# Patient Record
Sex: MALE | Race: WHITE | NOT HISPANIC OR LATINO | Employment: FULL TIME | URBAN - METROPOLITAN AREA
[De-identification: names, ages, dates, MRNs, and addresses within clinical notes are randomized per-mention and may not be internally consistent; named-entity substitution may affect disease eponyms.]

---

## 2018-01-16 ENCOUNTER — OFFICE VISIT (OUTPATIENT)
Dept: RETAIL CLINIC | Facility: CLINIC | Age: 48
End: 2018-01-16

## 2018-01-16 VITALS
OXYGEN SATURATION: 98 % | TEMPERATURE: 99.6 F | RESPIRATION RATE: 18 BRPM | DIASTOLIC BLOOD PRESSURE: 80 MMHG | SYSTOLIC BLOOD PRESSURE: 115 MMHG

## 2018-01-16 DIAGNOSIS — R05.9 COUGH: ICD-10-CM

## 2018-01-16 DIAGNOSIS — J10.1 INFLUENZA A: Primary | ICD-10-CM

## 2018-01-16 PROBLEM — R68.89 FLU-LIKE SYMPTOMS: Status: ACTIVE | Noted: 2018-01-16

## 2018-01-16 LAB
EXPIRATION DATE: ABNORMAL
FLUAV AG NPH QL: ABNORMAL
FLUBV AG NPH QL: NEGATIVE
INTERNAL CONTROL: ABNORMAL
Lab: ABNORMAL

## 2018-01-16 PROCEDURE — 99213 OFFICE O/P EST LOW 20 MIN: CPT | Performed by: NURSE PRACTITIONER

## 2018-01-16 PROCEDURE — 87804 INFLUENZA ASSAY W/OPTIC: CPT | Performed by: NURSE PRACTITIONER

## 2018-01-16 RX ORDER — OSELTAMIVIR PHOSPHATE 75 MG/1
75 CAPSULE ORAL 2 TIMES DAILY
Qty: 10 CAPSULE | Refills: 0 | Status: SHIPPED | OUTPATIENT
Start: 2018-01-16 | End: 2018-03-09

## 2018-01-16 RX ORDER — BROMPHENIRAMINE MALEATE, PSEUDOEPHEDRINE HYDROCHLORIDE, AND DEXTROMETHORPHAN HYDROBROMIDE 2; 30; 10 MG/5ML; MG/5ML; MG/5ML
5 SYRUP ORAL 4 TIMES DAILY PRN
Qty: 150 ML | Refills: 0 | Status: SHIPPED | OUTPATIENT
Start: 2018-01-16 | End: 2018-03-09

## 2018-01-16 NOTE — PROGRESS NOTES
Subjective   Allen Johnson is a 47 y.o. male.     Flu Symptoms   This is a new problem. The current episode started yesterday. The problem occurs constantly. The problem has been gradually worsening. Associated symptoms include coughing, fatigue, a fever, headaches and myalgias. He has tried acetaminophen for the symptoms. The treatment provided mild relief.   Cough   This is a new problem. The current episode started yesterday. Associated symptoms include a fever, headaches, myalgias, postnasal drip and rhinorrhea. Pertinent negatives include no wheezing. Risk factors for lung disease include smoking/tobacco exposure. His past medical history is significant for environmental allergies and pneumonia.        The following portions of the patient's history were reviewed and updated as appropriate: allergies, current medications, past family history, past medical history, past social history, past surgical history and problem list.    Review of Systems   Constitutional: Positive for fatigue and fever.   HENT: Positive for postnasal drip, rhinorrhea and voice change.    Eyes: Negative.    Respiratory: Positive for cough. Negative for chest tightness and wheezing.    Cardiovascular: Negative.    Gastrointestinal: Negative.    Musculoskeletal: Positive for myalgias.   Allergic/Immunologic: Positive for environmental allergies.   Neurological: Positive for headaches.       Objective   Physical Exam   Constitutional: He appears well-developed.   HENT:   Head: Normocephalic and atraumatic.   Right Ear: External ear normal.   Left Ear: External ear normal.   Nose: Mucosal edema and rhinorrhea present. Right sinus exhibits no maxillary sinus tenderness and no frontal sinus tenderness. Left sinus exhibits no maxillary sinus tenderness and no frontal sinus tenderness.   Mouth/Throat: No oropharyngeal exudate, posterior oropharyngeal edema or posterior oropharyngeal erythema.   Eyes: Pupils are equal, round, and reactive to  light.   Neck: Normal range of motion.   Cardiovascular: Normal rate, regular rhythm and normal heart sounds.    Pulmonary/Chest: Effort normal. No respiratory distress. He has no decreased breath sounds. He has no wheezes. He has rhonchi in the right middle field and the left middle field. He has no rales. He exhibits no tenderness.   Abdominal: Soft. Bowel sounds are normal.   Nursing note and vitals reviewed.      Assessment/Plan   Allen was seen today for flu symptoms.    Diagnoses and all orders for this visit:    Influenza A  -     POCT Influenza A/B    Other orders  -     oseltamivir (TAMIFLU) 75 MG capsule; Take 1 capsule by mouth 2 (Two) Times a Day.  -     Chlorcyclizine-Pseudoephed (STAHIST AD) 25-60 MG tablet; Take 1 tablet by mouth 2 (Two) Times a Day for 7 days.  -     brompheniramine-pseudoephedrine-DM 30-2-10 MG/5ML syrup; Take 5 mL by mouth 4 (Four) Times a Day As Needed for Cough.      Talked to the patient about the diagnosis and educate the patient and advise to visit to PCP if the symptoms worsens

## 2018-01-16 NOTE — PATIENT INSTRUCTIONS
"Influenza, Adult  Influenza (“the flu\") is an infection in the lungs, nose, and throat (respiratory tract). It is caused by a virus. The flu causes many common cold symptoms, as well as a high fever and body aches. It can make you feel very sick.  The flu spreads easily from person to person (is contagious). Getting a flu shot (influenza vaccination) every year is the best way to prevent the flu.  Follow these instructions at home:  · Take over-the-counter and prescription medicines only as told by your doctor.  · Use a cool mist humidifier to add moisture (humidity) to the air in your home. This can make it easier to breathe.  · Rest as needed.  · Drink enough fluid to keep your pee (urine) clear or pale yellow.  · Cover your mouth and nose when you cough or sneeze.  · Wash your hands with soap and water often, especially after you cough or sneeze. If you cannot use soap and water, use hand .  · Stay home from work or school as told by your doctor. Unless you are visiting your doctor, try to avoid leaving home until your fever has been gone for 24 hours without the use of medicine.  · Keep all follow-up visits as told by your doctor. This is important.  How is this prevented?  · Getting a yearly (annual) flu shot is the best way to avoid getting the flu. You may get the flu shot in late summer, fall, or winter. Ask your doctor when you should get your flu shot.  · Wash your hands often or use hand  often.  · Avoid contact with people who are sick during cold and flu season.  · Eat healthy foods.  · Drink plenty of fluids.  · Get enough sleep.  · Exercise regularly.  Contact a doctor if:  · You get new symptoms.  · You have:  ¨ Chest pain.  ¨ Watery poop (diarrhea).  ¨ A fever.  · Your cough gets worse.  · You start to have more mucus.  · You feel sick to your stomach (nauseous).  · You throw up (vomit).  Get help right away if:  · You start to be short of breath or have trouble breathing.  · Your " skin or nails turn a bluish color.  · You have very bad pain or stiffness in your neck.  · You get a sudden headache.  · You get sudden pain in your face or ear.  · You cannot stop throwing up.  This information is not intended to replace advice given to you by your health care provider. Make sure you discuss any questions you have with your health care provider.  Document Released: 09/26/2009 Document Revised: 05/25/2017 Document Reviewed: 10/11/2016  [employer] Interactive Patient Education © 2017 [employer]  Talked to the patient about the diagnosis and educate the patient and advise to visit to PCP if the symptoms worsens  Cough, Adult  Coughing is a reflex that clears your throat and your airways. Coughing helps to heal and protect your lungs. It is normal to cough occasionally, but a cough that happens with other symptoms or lasts a long time may be a sign of a condition that needs treatment. A cough may last only 2-3 weeks (acute), or it may last longer than 8 weeks (chronic).  What are the causes?  Coughing is commonly caused by:  · Breathing in substances that irritate your lungs.  · A viral or bacterial respiratory infection.  · Allergies.  · Asthma.  · Postnasal drip.  · Smoking.  · Acid backing up from the stomach into the esophagus (gastroesophageal reflux).  · Certain medicines.  · Chronic lung problems, including COPD (or rarely, lung cancer).  · Other medical conditions such as heart failure.  Follow these instructions at home:  Pay attention to any changes in your symptoms. Take these actions to help with your discomfort:  · Take medicines only as told by your health care provider.  ¨ If you were prescribed an antibiotic medicine, take it as told by your health care provider. Do not stop taking the antibiotic even if you start to feel better.  ¨ Talk with your health care provider before you take a cough suppressant medicine.  · Drink enough fluid to keep your urine clear or pale yellow.  · If the  air is dry, use a cold steam vaporizer or humidifier in your bedroom or your home to help loosen secretions.  · Avoid anything that causes you to cough at work or at home.  · If your cough is worse at night, try sleeping in a semi-upright position.  · Avoid cigarette smoke. If you smoke, quit smoking. If you need help quitting, ask your health care provider.  · Avoid caffeine.  · Avoid alcohol.  · Rest as needed.  Contact a health care provider if:  · You have new symptoms.  · You cough up pus.  · Your cough does not get better after 2-3 weeks, or your cough gets worse.  · You cannot control your cough with suppressant medicines and you are losing sleep.  · You develop pain that is getting worse or pain that is not controlled with pain medicines.  · You have a fever.  · You have unexplained weight loss.  · You have night sweats.  Get help right away if:  · You cough up blood.  · You have difficulty breathing.  · Your heartbeat is very fast.  This information is not intended to replace advice given to you by your health care provider. Make sure you discuss any questions you have with your health care provider.  Document Released: 06/15/2012 Document Revised: 05/25/2017 Document Reviewed: 02/24/2016  [employer] Interactive Patient Education © 2017 [employer]  Talked to the patient about the diagnosis and the positive flu test educate the patient and advise to visit to PCP if the symptoms worsens

## 2018-03-09 ENCOUNTER — OFFICE VISIT (OUTPATIENT)
Dept: INTERNAL MEDICINE | Facility: CLINIC | Age: 48
End: 2018-03-09

## 2018-03-09 VITALS
WEIGHT: 251 LBS | HEIGHT: 71 IN | HEART RATE: 81 BPM | DIASTOLIC BLOOD PRESSURE: 84 MMHG | SYSTOLIC BLOOD PRESSURE: 134 MMHG | BODY MASS INDEX: 35.14 KG/M2 | OXYGEN SATURATION: 95 %

## 2018-03-09 DIAGNOSIS — L98.9 NON-HEALING SKIN LESION: Primary | ICD-10-CM

## 2018-03-09 PROCEDURE — 99213 OFFICE O/P EST LOW 20 MIN: CPT | Performed by: INTERNAL MEDICINE

## 2018-03-09 NOTE — PROGRESS NOTES
Subjective     Allen Johnson is a 48 y.o. male, who presents with a chief complaint of   Chief Complaint   Patient presents with   • Nevus     two spots on back, has gotten large and discolored. X1yr       HPI   The pt has 2 large lesions on his back.  The one on his upper back for a couple of years and a lower one that has been there 8-12 months.  He has never had skin ca..  No hx skin cancer.  + tobacco use.   He has  worked outside with out a shirt for years.  He does wear sunscreen.      The following portions of the patient's history were reviewed and updated as appropriate: allergies, current medications, past family history, past medical history, past social history, past surgical history and problem list.    Allergies: Review of patient's allergies indicates no known allergies.    Review of Systems   Constitutional: Negative.    HENT: Negative.    Eyes: Negative.    Respiratory: Negative.    Cardiovascular: Negative.    Gastrointestinal: Negative.    Endocrine: Negative.    Genitourinary: Negative.    Musculoskeletal: Negative.    Skin:        Skin lesion   Allergic/Immunologic: Negative.    Neurological: Negative.    Hematological: Negative.    Psychiatric/Behavioral: Negative.    All other systems reviewed and are negative.      Objective     Wt Readings from Last 3 Encounters:   03/09/18 114 kg (251 lb)   03/26/15 103 kg (226 lb 9.1 oz)   07/24/14 116 kg (256 lb 4.2 oz)     Temp Readings from Last 3 Encounters:   01/16/18 99.6 °F (37.6 °C)   03/26/15 98.8 °F (37.1 °C)   07/24/14 98.5 °F (36.9 °C)     BP Readings from Last 3 Encounters:   03/09/18 134/84   01/16/18 115/80   03/26/15 122/90     Pulse Readings from Last 3 Encounters:   03/09/18 81   03/26/15 68   07/24/14 68     Body mass index is 35.01 kg/(m^2).  SpO2 Readings from Last 3 Encounters:   03/09/18 95%   01/16/18 98%   03/26/15 97%       Physical Exam   Constitutional: He is oriented to person, place, and time. He appears well-developed and  well-nourished. No distress.   HENT:   Head: Normocephalic and atraumatic.   Right Ear: External ear normal.   Left Ear: External ear normal.   Nose: Nose normal.   Mouth/Throat: Oropharynx is clear and moist.   Eyes: Conjunctivae and EOM are normal. Pupils are equal, round, and reactive to light.   Neck: Normal range of motion. Neck supple.   Cardiovascular: Normal rate, regular rhythm, normal heart sounds and intact distal pulses.    Pulmonary/Chest: Effort normal and breath sounds normal. No respiratory distress. He has no wheezes.   Musculoskeletal: Normal range of motion.   Normal gait   Neurological: He is alert and oriented to person, place, and time.   Skin: Skin is warm and dry.   2cm lesion on right upper back with ulceration and scabs  1cm lesion on lower back that is raised with telangiectasias.    Psychiatric: He has a normal mood and affect. His behavior is normal. Judgment and thought content normal.   Nursing note and vitals reviewed.      Results for orders placed or performed in visit on 01/16/18   POCT Influenza A/B   Result Value Ref Range    Rapid Influenza A Ag positve     Rapid Influenza B Ag negative     Internal Control Passed Passed    Lot Number [id number]     Expiration Date 12/2019        Assessment/Plan   Allen was seen today for nevus.    Diagnoses and all orders for this visit:    Non-healing skin lesion  -     Ambulatory Referral to General Surgery  -     Ambulatory Referral to Dermatology      Will try to get pt set up with surgery for lesion removal.  Discussed concern for skin cancer with patient.  Will also refer to derm but would like to get lesions removed and path report as soon as possible.    Outpatient Medications Prior to Visit   Medication Sig Dispense Refill   • brompheniramine-pseudoephedrine-DM 30-2-10 MG/5ML syrup Take 5 mL by mouth 4 (Four) Times a Day As Needed for Cough. 150 mL 0   • oseltamivir (TAMIFLU) 75 MG capsule Take 1 capsule by mouth 2 (Two) Times a Day. 10  capsule 0     No facility-administered medications prior to visit.      No orders of the defined types were placed in this encounter.    [unfilled]  Medications Discontinued During This Encounter   Medication Reason   • brompheniramine-pseudoephedrine-DM 30-2-10 MG/5ML syrup *Therapy completed   • oseltamivir (TAMIFLU) 75 MG capsule *Therapy completed         Return if symptoms worsen or fail to improve.

## 2018-07-12 ENCOUNTER — OFFICE VISIT (OUTPATIENT)
Dept: RETAIL CLINIC | Facility: CLINIC | Age: 48
End: 2018-07-12

## 2018-07-12 VITALS
TEMPERATURE: 98.3 F | RESPIRATION RATE: 18 BRPM | SYSTOLIC BLOOD PRESSURE: 122 MMHG | DIASTOLIC BLOOD PRESSURE: 86 MMHG | HEART RATE: 84 BPM | OXYGEN SATURATION: 98 %

## 2018-07-12 DIAGNOSIS — K52.9 GASTROENTERITIS, ACUTE: Primary | ICD-10-CM

## 2018-07-12 PROCEDURE — 99213 OFFICE O/P EST LOW 20 MIN: CPT | Performed by: NURSE PRACTITIONER

## 2018-07-12 RX ORDER — PROMETHAZINE HYDROCHLORIDE 25 MG/1
25 TABLET ORAL EVERY 8 HOURS PRN
Qty: 18 TABLET | Refills: 0 | Status: SHIPPED | OUTPATIENT
Start: 2018-07-12 | End: 2018-07-15

## 2018-07-12 NOTE — PROGRESS NOTES
"Subjective   Allen Johnson is a 48 y.o. male.     Patient reports ill contact whom has been complaining of similar sx as he is experiencing beginning yesterday, 1 day after they both ate at a restaurant near by.  2 other people who ate dinner with them are asymptomatic.       Nausea   This is a new problem. The current episode started yesterday. The problem occurs constantly. The problem has been gradually improving. Associated symptoms include abdominal pain (\"cramping\" bilat lower ABD), anorexia, chills, coughing (unchanged from baseline), diaphoresis, fatigue, a fever (tmax 99.2), headaches, myalgias and nausea. Pertinent negatives include no change in bowel habit, chest pain, congestion, neck pain, sore throat, swollen glands, urinary symptoms, vertigo, visual change, vomiting or weakness. The symptoms are aggravated by eating. Treatments tried: ibuprofen last HS. The treatment provided mild relief.       The following portions of the patient's history were reviewed and updated as appropriate: allergies, current medications, past family history, past medical history, past social history, past surgical history and problem list.    Review of Systems   Constitutional: Positive for appetite change (diminished), chills, diaphoresis, fatigue and fever (tmax 99.2).   HENT: Negative for congestion, mouth sores, sinus pressure, sneezing, sore throat, trouble swallowing and voice change.    Respiratory: Positive for cough (unchanged from baseline). Negative for chest tightness and stridor.    Cardiovascular: Negative for chest pain and palpitations.   Gastrointestinal: Positive for abdominal pain (\"cramping\" bilat lower ABD), anorexia and nausea. Negative for abdominal distention, blood in stool, change in bowel habit, constipation, diarrhea and vomiting.   Genitourinary: Negative for decreased urine volume, dysuria and flank pain.   Musculoskeletal: Positive for myalgias. Negative for neck pain.   Skin: Negative for " color change.   Allergic/Immunologic: Negative for immunocompromised state.   Neurological: Positive for headaches. Negative for dizziness, vertigo, syncope and weakness.       Objective   Physical Exam   Constitutional: He is oriented to person, place, and time. He appears well-developed and well-nourished. He is cooperative.  Non-toxic appearance. He does not appear ill. No distress.   HENT:   Mouth/Throat: Uvula is midline, oropharynx is clear and moist and mucous membranes are normal.   Eyes: Conjunctivae and lids are normal.   Cardiovascular: Normal rate, regular rhythm, S1 normal and S2 normal.    Pulmonary/Chest: Effort normal. He has decreased breath sounds in the right upper field, the right middle field, the right lower field, the left upper field, the left middle field and the left lower field. He has no wheezes. He has no rhonchi. He has no rales.   Abdominal: Soft. Normal appearance and bowel sounds are normal. He exhibits no abdominal bruit and no mass. There is no tenderness. There is no rigidity, no rebound, no guarding, no CVA tenderness, no tenderness at McBurney's point and negative Peace's sign.   Lymphadenopathy:     He has no cervical adenopathy.   Neurological: He is alert and oriented to person, place, and time.   Skin: Skin is warm. He is diaphoretic. No pallor.   Vitals reviewed.      Assessment/Plan   Allen was seen today for nausea.    Diagnoses and all orders for this visit:    Gastroenteritis, acute  -     promethazine (PHENERGAN) 25 MG tablet; Take 1 tablet by mouth Every 8 (Eight) Hours As Needed for Nausea or Vomiting for up to 3 days.          -     Symptomatic care: rest, BRAT diet-advance as tolerated, ensure adequate H2O intake, may use ibuprofen OTC PRN pain/fever relief        -     Follow up with PCP for persistent symptoms or UC/ER for worsening symptoms-discussed symptoms warranting ER evluation

## 2018-07-12 NOTE — PATIENT INSTRUCTIONS
Viral Gastroenteritis, Adult  Viral gastroenteritis is also known as the stomach flu. This condition is caused by various viruses. These viruses can be passed from person to person very easily (are very contagious). This condition may affect your stomach, small intestine, and large intestine. It can cause sudden watery diarrhea, fever, and vomiting.  Diarrhea and vomiting can make you feel weak and cause you to become dehydrated. You may not be able to keep fluids down. Dehydration can make you tired and thirsty, cause you to have a dry mouth, and decrease how often you urinate. Older adults and people with other diseases or a weak immune system are at higher risk for dehydration.  It is important to replace the fluids that you lose from diarrhea and vomiting. If you become severely dehydrated, you may need to get fluids through an IV tube.  What are the causes?  Gastroenteritis is caused by various viruses, including rotavirus and norovirus. Norovirus is the most common cause in adults.  You can get sick by eating food, drinking water, or touching a surface contaminated with one of these viruses. You can also get sick from sharing utensils or other personal items with an infected person.  What increases the risk?  This condition is more likely to develop in people:  · Who have a weak defense system (immune system).  · Who live with one or more children who are younger than 2 years old.  · Who live in a nursing home.  · Who go on cruise ships.    What are the signs or symptoms?  Symptoms of this condition start suddenly 1-2 days after exposure to a virus. Symptoms may last a few days or as long as a week. The most common symptoms are watery diarrhea and vomiting. Other symptoms include:  · Fever.  · Headache.  · Fatigue.  · Pain in the abdomen.  · Chills.  · Weakness.  · Nausea.  · Muscle aches.  · Loss of appetite.    How is this diagnosed?  This condition is diagnosed with a medical history and physical exam. You  may also have a stool test to check for viruses or other infections.  How is this treated?  This condition typically goes away on its own. The focus of treatment is to restore lost fluids (rehydration). Your health care provider may recommend that you take an oral rehydration solution (ORS) to replace important salts and minerals (electrolytes) in your body. Severe cases of this condition may require giving fluids through an IV tube.  Treatment may also include medicine to help with your symptoms.  Follow these instructions at home:  Follow instructions from your health care provider about how to care for yourself at home.  Eating and drinking  Follow these recommendations as told by your health care provider:  · Take an ORS. This is a drink that is sold at pharmacies and retail stores.  · Drink clear fluids in small amounts as you are able. Clear fluids include water, ice chips, diluted fruit juice, and low-calorie sports drinks.  · Eat bland, easy-to-digest foods in small amounts as you are able. These foods include bananas, applesauce, rice, lean meats, toast, and crackers.  · Avoid fluids that contain a lot of sugar or caffeine, such as energy drinks, sports drinks, and soda.  · Avoid alcohol.  · Avoid spicy or fatty foods.    General instructions    · Drink enough fluid to keep your urine clear or pale yellow.  · Wash your hands often. If soap and water are not available, use hand .  · Make sure that all people in your household wash their hands well and often.  · Take over-the-counter and prescription medicines only as told by your health care provider.  · Rest at home while you recover.  · Watch your condition for any changes.  · Take a warm bath to relieve any burning or pain from frequent diarrhea episodes.  · Keep all follow-up visits as told by your health care provider. This is important.  Contact a health care provider if:  · You cannot keep fluids down.  · Your symptoms get worse.  · You have  new symptoms.  · You feel light-headed or dizzy.  · You have muscle cramps.  Get help right away if:  · You have chest pain.  · You feel extremely weak or you faint.  · You see blood in your vomit.  · Your vomit looks like coffee grounds.  · You have bloody or black stools or stools that look like tar.  · You have a severe headache, a stiff neck, or both.  · You have a rash.  · You have severe pain, cramping, or bloating in your abdomen.  · You have trouble breathing or you are breathing very quickly.  · Your heart is beating very quickly.  · Your skin feels cold and clammy.  · You feel confused.  · You have pain when you urinate.  · You have signs of dehydration, such as:  ? Dark urine, very little urine, or no urine.  ? Cracked lips.  ? Dry mouth.  ? Sunken eyes.  ? Sleepiness.  ? Weakness.  This information is not intended to replace advice given to you by your health care provider. Make sure you discuss any questions you have with your health care provider.  Document Released: 12/18/2006 Document Revised: 05/31/2017 Document Reviewed: 08/23/2016  [employer] Interactive Patient Education © 2018 [employer]

## 2018-09-10 ENCOUNTER — OFFICE VISIT (OUTPATIENT)
Dept: RETAIL CLINIC | Facility: CLINIC | Age: 48
End: 2018-09-10

## 2018-09-10 VITALS
SYSTOLIC BLOOD PRESSURE: 116 MMHG | RESPIRATION RATE: 16 BRPM | DIASTOLIC BLOOD PRESSURE: 78 MMHG | HEART RATE: 69 BPM | OXYGEN SATURATION: 98 % | TEMPERATURE: 98.4 F

## 2018-09-10 DIAGNOSIS — J04.0 LARYNGITIS, ACUTE: ICD-10-CM

## 2018-09-10 DIAGNOSIS — J40 BRONCHITIS: Primary | ICD-10-CM

## 2018-09-10 PROCEDURE — 99213 OFFICE O/P EST LOW 20 MIN: CPT | Performed by: NURSE PRACTITIONER

## 2018-09-10 RX ORDER — PREDNISONE 20 MG/1
20 TABLET ORAL 2 TIMES DAILY
Qty: 14 TABLET | Refills: 0 | Status: SHIPPED | OUTPATIENT
Start: 2018-09-10 | End: 2018-09-17

## 2018-09-10 RX ORDER — DOXYCYCLINE HYCLATE 100 MG
100 TABLET ORAL 2 TIMES DAILY
Qty: 14 TABLET | Refills: 0 | Status: SHIPPED | OUTPATIENT
Start: 2018-09-10 | End: 2018-09-17

## 2018-09-10 NOTE — PATIENT INSTRUCTIONS
Laryngitis  Laryngitis is inflammation of your vocal cords. This causes hoarseness, coughing, loss of voice, sore throat, or a dry throat. Your vocal cords are two bands of muscles that are found in your throat. When you speak, these cords come together and vibrate. These vibrations come out through your mouth as sound. When your vocal cords are inflamed, your voice sounds different.  Laryngitis can be temporary (acute) or long-term (chronic). Most cases of acute laryngitis improve with time. Chronic laryngitis is laryngitis that lasts for more than three weeks.  What are the causes?  Acute laryngitis may be caused by:  · A viral infection.  · Lots of talking, yelling, or singing. This is also called vocal strain.  · Bacterial infections.    Chronic laryngitis may be caused by:  · Vocal strain.  · Injury to your vocal cords.  · Acid reflux (gastroesophageal reflux disease or GERD).  · Allergies.  · Sinus infection.  · Smoking.  · Alcohol abuse.  · Breathing in chemicals or dust.  · Growths on the vocal cords.    What increases the risk?  Risk factors for laryngitis include:  · Smoking.  · Alcohol abuse.  · Having allergies.    What are the signs or symptoms?  Symptoms of laryngitis may include:  · Low, hoarse voice.  · Loss of voice.  · Dry cough.  · Sore throat.  · Stuffy nose.    How is this diagnosed?  Laryngitis may be diagnosed by:  · Physical exam.  · Throat culture.  · Blood test.  · Laryngoscopy. This procedure allows your health care provider to look at your vocal cords with a mirror or viewing tube.    How is this treated?  Treatment for laryngitis depends on what is causing it. Usually, treatment involves resting your voice and using medicines to soothe your throat. However, if your laryngitis is caused by a bacterial infection, you may need to take antibiotic medicine. If your laryngitis is caused by a growth, you may need to have a procedure to remove it.  Follow these instructions at home:  · Drink  enough fluid to keep your urine clear or pale yellow.  · Breathe in moist air. Use a humidifier if you live in a dry climate.  · Take medicines only as directed by your health care provider.  · If you were prescribed an antibiotic medicine, finish it all even if you start to feel better.  · Do not smoke cigarettes or electronic cigarettes. If you need help quitting, ask your health care provider.  · Talk as little as possible. Also avoid whispering, which can cause vocal strain.  · Write instead of talking. Do this until your voice is back to normal.  Contact a health care provider if:  · You have a fever.  · You have increasing pain.  · You have difficulty swallowing.  Get help right away if:  · You cough up blood.  · You have trouble breathing.  This information is not intended to replace advice given to you by your health care provider. Make sure you discuss any questions you have with your health care provider.  Document Released: 12/18/2006 Document Revised: 05/25/2017 Document Reviewed: 06/02/2015  [employer] Interactive Patient Education © 2018 [employer]  Acute Bronchitis, Adult  Acute bronchitis is sudden (acute) swelling of the air tubes (bronchi) in the lungs. Acute bronchitis causes these tubes to fill with mucus, which can make it hard to breathe. It can also cause coughing or wheezing.  In adults, acute bronchitis usually goes away within 2 weeks. A cough caused by bronchitis may last up to 3 weeks. Smoking, allergies, and asthma can make the condition worse. Repeated episodes of bronchitis may cause further lung problems, such as chronic obstructive pulmonary disease (COPD).  What are the causes?  This condition can be caused by germs and by substances that irritate the lungs, including:  · Cold and flu viruses. This condition is most often caused by the same virus that causes a cold.  · Bacteria.  · Exposure to tobacco smoke, dust, fumes, and air pollution.    What increases the risk?  This  condition is more likely to develop in people who:  · Have close contact with someone with acute bronchitis.  · Are exposed to lung irritants, such as tobacco smoke, dust, fumes, and vapors.  · Have a weak immune system.  · Have a respiratory condition such as asthma.    What are the signs or symptoms?  Symptoms of this condition include:  · A cough.  · Coughing up clear, yellow, or green mucus.  · Wheezing.  · Chest congestion.  · Shortness of breath.  · A fever.  · Body aches.  · Chills.  · A sore throat.    How is this diagnosed?  This condition is usually diagnosed with a physical exam. During the exam, your health care provider may order tests, such as chest X-rays, to rule out other conditions. He or she may also:  · Test a sample of your mucus for bacterial infection.  · Check the level of oxygen in your blood. This is done to check for pneumonia.  · Do a chest X-ray or lung function testing to rule out pneumonia and other conditions.  · Perform blood tests.    Your health care provider will also ask about your symptoms and medical history.  How is this treated?  Most cases of acute bronchitis clear up over time without treatment. Your health care provider may recommend:  · Drinking more fluids. Drinking more makes your mucus thinner, which may make it easier to breathe.  · Taking a medicine for a fever or cough.  · Taking an antibiotic medicine.  · Using an inhaler to help improve shortness of breath and to control a cough.  · Using a cool mist vaporizer or humidifier to make it easier to breathe.    Follow these instructions at home:  Medicines  · Take over-the-counter and prescription medicines only as told by your health care provider.  · If you were prescribed an antibiotic, take it as told by your health care provider. Do not stop taking the antibiotic even if you start to feel better.  General instructions  · Get plenty of rest.  · Drink enough fluids to keep your urine clear or pale yellow.  · Avoid  smoking and secondhand smoke. Exposure to cigarette smoke or irritating chemicals will make bronchitis worse. If you smoke and you need help quitting, ask your health care provider. Quitting smoking will help your lungs heal faster.  · Use an inhaler, cool mist vaporizer, or humidifier as told by your health care provider.  · Keep all follow-up visits as told by your health care provider. This is important.  How is this prevented?  To lower your risk of getting this condition again:  · Wash your hands often with soap and water. If soap and water are not available, use hand .  · Avoid contact with people who have cold symptoms.  · Try not to touch your hands to your mouth, nose, or eyes.  · Make sure to get the flu shot every year.    Contact a health care provider if:  · Your symptoms do not improve in 2 weeks of treatment.  Get help right away if:  · You cough up blood.  · You have chest pain.  · You have severe shortness of breath.  · You become dehydrated.  · You faint or keep feeling like you are going to faint.  · You keep vomiting.  · You have a severe headache.  · Your fever or chills gets worse.  This information is not intended to replace advice given to you by your health care provider. Make sure you discuss any questions you have with your health care provider.  Document Released: 01/25/2006 Document Revised: 07/12/2017 Document Reviewed: 06/07/2017  [employer] Interactive Patient Education © 2018 [employer]

## 2018-09-10 NOTE — PROGRESS NOTES
Subjective   Allen Johnson is a 48 y.o. male.     Sore Throat    This is a new problem. The current episode started today. The problem has been unchanged. Neither side of throat is experiencing more pain than the other. There has been no fever. The pain is moderate. Associated symptoms include congestion, coughing, headaches, a hoarse voice and a plugged ear sensation. Pertinent negatives include no diarrhea, ear discharge, ear pain, neck pain, shortness of breath, stridor, swollen glands, trouble swallowing or vomiting. He has had no exposure to strep or mono. He has tried NSAIDs (allegra) for the symptoms. The treatment provided mild relief.   Cough   This is a new problem. The current episode started in the past 7 days. The problem has been waxing and waning. The problem occurs every few minutes. The cough is non-productive. Associated symptoms include chills, ear congestion, a fever (not measured but has had body aches and chills), headaches, myalgias, nasal congestion, postnasal drip and a sore throat. Pertinent negatives include no chest pain, ear pain, eye redness, heartburn, hemoptysis, rhinorrhea, shortness of breath, sweats, weight loss or wheezing. The symptoms are aggravated by lying down. Risk factors for lung disease include smoking/tobacco exposure. Treatments tried: allegra, ibuprofen. The treatment provided mild relief. His past medical history is significant for bronchitis and environmental allergies. There is no history of asthma, COPD or pneumonia.       The following portions of the patient's history were reviewed and updated as appropriate: allergies, current medications, past medical history, past social history, past surgical history and problem list.    Review of Systems   Constitutional: Positive for chills, fatigue and fever (not measured but has had body aches and chills). Negative for appetite change, diaphoresis and weight loss.   HENT: Positive for congestion, hoarse voice, postnasal  drip, sinus pressure (intermittently over past week), sore throat and voice change (hoarseness). Negative for dental problem, ear discharge, ear pain, mouth sores, nosebleeds, rhinorrhea, sinus pain, sneezing and trouble swallowing.    Eyes: Negative for redness and itching.   Respiratory: Positive for cough. Negative for hemoptysis, choking, chest tightness, shortness of breath, wheezing and stridor.    Cardiovascular: Negative for chest pain.   Gastrointestinal: Negative for diarrhea, heartburn, nausea and vomiting.   Musculoskeletal: Positive for myalgias. Negative for neck pain and neck stiffness.   Allergic/Immunologic: Positive for environmental allergies. Negative for immunocompromised state.   Neurological: Positive for headaches. Negative for dizziness.       Objective   Physical Exam   Constitutional: He is oriented to person, place, and time. He appears well-developed and well-nourished. He is cooperative.  Non-toxic appearance. He does not appear ill. No distress.   HENT:   Right Ear: Tympanic membrane, external ear and ear canal normal.   Left Ear: External ear and ear canal normal. Tympanic membrane is retracted. Tympanic membrane is not perforated, not erythematous and not bulging. A middle ear effusion is present.   Nose: Mucosal edema present. No rhinorrhea. Right sinus exhibits no maxillary sinus tenderness and no frontal sinus tenderness. Left sinus exhibits no maxillary sinus tenderness and no frontal sinus tenderness.   Mouth/Throat: Uvula is midline and mucous membranes are normal. No oral lesions. No uvula swelling. Posterior oropharyngeal erythema present. No oropharyngeal exudate or posterior oropharyngeal edema. Tonsils are 2+ on the right. Tonsils are 2+ on the left. No tonsillar exudate.   Eyes: Conjunctivae and lids are normal.   Cardiovascular: Normal rate, regular rhythm, S1 normal and S2 normal.    Pulmonary/Chest: Effort normal. He has decreased breath sounds in the right upper  field, the right middle field, the right lower field, the left upper field, the left middle field and the left lower field. He has no wheezes. He has no rhonchi. He has no rales.   Lymphadenopathy:     He has no cervical adenopathy.   Neurological: He is alert and oriented to person, place, and time.   Skin: Skin is warm and dry. He is not diaphoretic.   Vitals reviewed.      Assessment/Plan   Allen was seen today for sore throat and cough.    Diagnoses and all orders for this visit:    Bronchitis  -     predniSONE (DELTASONE) 20 MG tablet; Take 1 tablet by mouth 2 (Two) Times a Day for 7 days.  -     doxycycline (VIBRAMYICN) 100 MG tablet; Take 1 tablet by mouth 2 (Two) Times a Day for 7 days.    Laryngitis, acute  -     predniSONE (DELTASONE) 20 MG tablet; Take 1 tablet by mouth 2 (Two) Times a Day for 7 days.          -     Follow up with PCP for persistent symptoms or UC/ER for worsening symptoms

## 2019-01-21 ENCOUNTER — APPOINTMENT (OUTPATIENT)
Dept: GENERAL RADIOLOGY | Facility: HOSPITAL | Age: 49
End: 2019-01-21

## 2019-01-21 ENCOUNTER — HOSPITAL ENCOUNTER (EMERGENCY)
Facility: HOSPITAL | Age: 49
Discharge: HOME | End: 2019-01-21
Attending: EMERGENCY MEDICINE | Admitting: EMERGENCY MEDICINE

## 2019-01-21 VITALS
DIASTOLIC BLOOD PRESSURE: 104 MMHG | HEART RATE: 63 BPM | TEMPERATURE: 97.9 F | WEIGHT: 230 LBS | RESPIRATION RATE: 20 BRPM | HEIGHT: 71 IN | BODY MASS INDEX: 32.2 KG/M2 | OXYGEN SATURATION: 99 % | SYSTOLIC BLOOD PRESSURE: 145 MMHG

## 2019-01-21 DIAGNOSIS — S40.022A CONTUSION OF LEFT UPPER EXTREMITY, INITIAL ENCOUNTER: ICD-10-CM

## 2019-01-21 DIAGNOSIS — S22.42XA CLOSED FRACTURE OF MULTIPLE RIBS OF LEFT SIDE, INITIAL ENCOUNTER: Primary | ICD-10-CM

## 2019-01-21 PROCEDURE — 71101 X-RAY EXAM UNILAT RIBS/CHEST: CPT

## 2019-01-21 PROCEDURE — 99282 EMERGENCY DEPT VISIT SF MDM: CPT | Performed by: EMERGENCY MEDICINE

## 2019-01-21 PROCEDURE — 99283 EMERGENCY DEPT VISIT LOW MDM: CPT

## 2019-01-21 RX ORDER — HYDROCODONE BITARTRATE AND ACETAMINOPHEN 5; 325 MG/1; MG/1
1 TABLET ORAL EVERY 8 HOURS PRN
Qty: 15 TABLET | Refills: 0 | Status: SHIPPED | OUTPATIENT
Start: 2019-01-21 | End: 2019-01-26

## 2019-01-21 RX ORDER — HYDROCODONE BITARTRATE AND ACETAMINOPHEN 5; 325 MG/1; MG/1
1 TABLET ORAL ONCE
Status: COMPLETED | OUTPATIENT
Start: 2019-01-21 | End: 2019-01-21

## 2019-01-21 RX ADMIN — HYDROCODONE BITARTRATE AND ACETAMINOPHEN 1 TABLET: 5; 325 TABLET ORAL at 10:20

## 2019-01-21 NOTE — ED PROVIDER NOTES
Subjective   History of Present Illness  History of Present Illness    Chief complaint: Fall, rib pain    Location: Left lower ribs    Quality/Severity:  Moderate pain    Timing/Duration: Injury just occurred a little while ago this morning    Modifying Factors: None    Narrative: This patient presents for evaluation of pain in the left lower ribs after an accidental fall.  Apparently he was working this morning and he slipped on an icy surface.  He landed directly on his left side.  This knocked the wind out of him and he had some immediate pain in the left lower ribs area.  He also complains of some pain in the left elbow and arm area.  He did not hit his head or have any loss of consciousness.  He was able to get back up and ambulate.  He has not had any medications for the pain prior to arrival.  He does not take any anticoagulation medicines    Associated Symptoms: Left arm pain    Review of Systems   Constitutional: Negative for activity change and fever.   HENT: Negative.    Respiratory: Negative for cough and shortness of breath.    Cardiovascular: Positive for chest pain (left lateral ribs). Negative for palpitations and leg swelling.   Gastrointestinal: Negative for abdominal pain and vomiting.   Musculoskeletal: Positive for arthralgias.   Skin: Negative for color change and rash.   Neurological: Negative for syncope and headaches.   All other systems reviewed and are negative.      Past Medical History:   Diagnosis Date   • Allergic    • CSF leak     teenager   • Meningitis     teenager       No Known Allergies    Past Surgical History:   Procedure Laterality Date   • CRANIOTOMY      x2, during teens       Family History   Problem Relation Age of Onset   • Diabetes Mother    • No Known Problems Father        Social History     Socioeconomic History   • Marital status:      Spouse name: Not on file   • Number of children: Not on file   • Years of education: Not on file   • Highest education level:  Not on file   Tobacco Use   • Smoking status: Current Every Day Smoker     Packs/day: 1.50     Years: 32.00     Pack years: 48.00     Types: Cigarettes   • Smokeless tobacco: Current User     Types: Snuff   Substance and Sexual Activity   • Alcohol use: Yes     Comment: occasional    • Drug use: No   • Sexual activity: Defer     ED Triage Vitals [01/21/19 0959]   Temp Heart Rate Resp BP SpO2   97.9 °F (36.6 °C) 63 20 (!) 145/104 99 %      Temp src Heart Rate Source Patient Position BP Location FiO2 (%)   Oral Monitor Sitting Right arm --           Objective   Physical Exam   Constitutional: He is oriented to person, place, and time. He appears well-developed and well-nourished.   HENT:   Head: Normocephalic and atraumatic.   Eyes: EOM are normal. Pupils are equal, round, and reactive to light. Right eye exhibits no discharge. Left eye exhibits no discharge.   Neck: Normal range of motion. Neck supple.   Cardiovascular: Normal rate, regular rhythm and intact distal pulses.   Pulmonary/Chest: Effort normal. No respiratory distress. He exhibits tenderness.   ttp along the left lower lateral ribs only.  No crepitance.    Clear breath sounds bilaterally.  Normal work of breathing.    Abdominal: He exhibits no mass. There is no tenderness. There is no guarding. No hernia.   No significant ttp along the LUQ    Musculoskeletal: Normal range of motion. He exhibits tenderness. He exhibits no edema or deformity.   Mild ttp along the left elbow.  No deformity.  Able to move in FROM w/o any difficulties.   Neurological: He is alert and oriented to person, place, and time. He exhibits normal muscle tone.   Skin: Skin is warm and dry. No rash noted. He is not diaphoretic. No erythema.   Psychiatric: He has a normal mood and affect. His behavior is normal. Judgment and thought content normal.   Nursing note and vitals reviewed.    RADIOLOGY        Study: X-ray left ribs    Findings: Minimally displaced fractures involving the  posterior lateral aspect of the left sixth and eighth ribs. No evidence of  pneumothorax.    Interpreted contemporaneously with treatment by Dr. Segura, independently viewed by me        Procedures           ED Course  ED Course as of Jan 21 1113   Mon Jan 21, 2019   1112 I reviewed the x-ray which does indicate 2 small left-sided rib fractures.  This explains the patient's pain.  There does not appear to be any evidence for pneumothorax or any clinical concern for solid organ injury.  We'll discharge home with a prescription for hydrocodone.  Advised patient to rest and follow-up with his primary care doctor for further evaluation as well as occupational health or Worker's Comp.-related concerns.  [VASILE]      ED Course User Index  [VASILE] Reynaldo Anderson MD                  MDM  Number of Diagnoses or Management Options     Amount and/or Complexity of Data Reviewed  Tests in the radiology section of CPT®: ordered and reviewed  Independent visualization of images, tracings, or specimens: yes    Risk of Complications, Morbidity, and/or Mortality  Presenting problems: moderate  Diagnostic procedures: low  Management options: moderate          Final diagnoses:   Closed fracture of multiple ribs of left side, initial encounter   Contusion of left upper extremity, initial encounter            Reynaldo Anderson MD  01/21/19 1113

## 2019-01-21 NOTE — DISCHARGE INSTRUCTIONS
Rest as needed.  Please return to the emergency room for any worsening pain, fevers, cough, weakness, difficulties breathing or any other concerns.

## 2023-11-23 ENCOUNTER — APPOINTMENT (OUTPATIENT)
Dept: CT IMAGING | Facility: HOSPITAL | Age: 53
End: 2023-11-23
Payer: COMMERCIAL

## 2023-11-23 ENCOUNTER — HOSPITAL ENCOUNTER (EMERGENCY)
Facility: HOSPITAL | Age: 53
Discharge: HOME | End: 2023-11-23
Attending: STUDENT IN AN ORGANIZED HEALTH CARE EDUCATION/TRAINING PROGRAM
Payer: COMMERCIAL

## 2023-11-23 VITALS
RESPIRATION RATE: 16 BRPM | DIASTOLIC BLOOD PRESSURE: 118 MMHG | OXYGEN SATURATION: 98 % | SYSTOLIC BLOOD PRESSURE: 161 MMHG | TEMPERATURE: 98.3 F | WEIGHT: 292 LBS | HEIGHT: 71 IN | HEART RATE: 70 BPM | BODY MASS INDEX: 40.88 KG/M2

## 2023-11-23 DIAGNOSIS — H53.9 VISUAL CHANGES: Primary | ICD-10-CM

## 2023-11-23 LAB
ALBUMIN SERPL-MCNC: 4.2 G/DL (ref 3.5–5.2)
ALBUMIN/GLOB SERPL: 1.8 G/DL
ALP SERPL-CCNC: 67 U/L (ref 39–117)
ALT SERPL W P-5'-P-CCNC: 33 U/L (ref 1–41)
ANION GAP SERPL CALCULATED.3IONS-SCNC: 9 MMOL/L (ref 5–15)
AST SERPL-CCNC: 23 U/L (ref 1–40)
BASOPHILS # BLD AUTO: 0.05 10*3/MM3 (ref 0–0.2)
BASOPHILS NFR BLD AUTO: 0.6 % (ref 0–1.5)
BILIRUB SERPL-MCNC: 0.4 MG/DL (ref 0–1.2)
BUN SERPL-MCNC: 28 MG/DL (ref 6–20)
BUN/CREAT SERPL: 25.2 (ref 7–25)
CALCIUM SPEC-SCNC: 9.4 MG/DL (ref 8.6–10.5)
CHLORIDE SERPL-SCNC: 104 MMOL/L (ref 98–107)
CO2 SERPL-SCNC: 26 MMOL/L (ref 22–29)
CREAT SERPL-MCNC: 1.11 MG/DL (ref 0.76–1.27)
DEPRECATED RDW RBC AUTO: 44.5 FL (ref 37–54)
EGFRCR SERPLBLD CKD-EPI 2021: 78.9 ML/MIN/1.73
EOSINOPHIL # BLD AUTO: 0.27 10*3/MM3 (ref 0–0.4)
EOSINOPHIL NFR BLD AUTO: 3.4 % (ref 0.3–6.2)
ERYTHROCYTE [DISTWIDTH] IN BLOOD BY AUTOMATED COUNT: 13 % (ref 12.3–15.4)
GLOBULIN UR ELPH-MCNC: 2.4 GM/DL
GLUCOSE SERPL-MCNC: 98 MG/DL (ref 65–99)
HCT VFR BLD AUTO: 48.8 % (ref 37.5–51)
HGB BLD-MCNC: 16.2 G/DL (ref 13–17.7)
IMM GRANULOCYTES # BLD AUTO: 0.09 10*3/MM3 (ref 0–0.05)
IMM GRANULOCYTES NFR BLD AUTO: 1.1 % (ref 0–0.5)
LYMPHOCYTES # BLD AUTO: 1.84 10*3/MM3 (ref 0.7–3.1)
LYMPHOCYTES NFR BLD AUTO: 23.4 % (ref 19.6–45.3)
MCH RBC QN AUTO: 30.1 PG (ref 26.6–33)
MCHC RBC AUTO-ENTMCNC: 33.2 G/DL (ref 31.5–35.7)
MCV RBC AUTO: 90.7 FL (ref 79–97)
MONOCYTES # BLD AUTO: 0.44 10*3/MM3 (ref 0.1–0.9)
MONOCYTES NFR BLD AUTO: 5.6 % (ref 5–12)
NEUTROPHILS NFR BLD AUTO: 5.19 10*3/MM3 (ref 1.7–7)
NEUTROPHILS NFR BLD AUTO: 65.9 % (ref 42.7–76)
PLATELET # BLD AUTO: 204 10*3/MM3 (ref 140–450)
PMV BLD AUTO: 10.1 FL (ref 6–12)
POTASSIUM SERPL-SCNC: 4 MMOL/L (ref 3.5–5.2)
PROT SERPL-MCNC: 6.6 G/DL (ref 6–8.5)
RBC # BLD AUTO: 5.38 10*6/MM3 (ref 4.14–5.8)
SODIUM SERPL-SCNC: 139 MMOL/L (ref 136–145)
TROPONIN T SERPL HS-MCNC: 9 NG/L
WBC NRBC COR # BLD AUTO: 7.88 10*3/MM3 (ref 3.4–10.8)

## 2023-11-23 PROCEDURE — 25510000001 IOPAMIDOL PER 1 ML: Performed by: STUDENT IN AN ORGANIZED HEALTH CARE EDUCATION/TRAINING PROGRAM

## 2023-11-23 PROCEDURE — 99285 EMERGENCY DEPT VISIT HI MDM: CPT

## 2023-11-23 PROCEDURE — 93010 ELECTROCARDIOGRAM REPORT: CPT | Performed by: INTERNAL MEDICINE

## 2023-11-23 PROCEDURE — 84484 ASSAY OF TROPONIN QUANT: CPT | Performed by: STUDENT IN AN ORGANIZED HEALTH CARE EDUCATION/TRAINING PROGRAM

## 2023-11-23 PROCEDURE — 70496 CT ANGIOGRAPHY HEAD: CPT

## 2023-11-23 PROCEDURE — 70450 CT HEAD/BRAIN W/O DYE: CPT

## 2023-11-23 PROCEDURE — 80053 COMPREHEN METABOLIC PANEL: CPT | Performed by: STUDENT IN AN ORGANIZED HEALTH CARE EDUCATION/TRAINING PROGRAM

## 2023-11-23 PROCEDURE — 93005 ELECTROCARDIOGRAM TRACING: CPT | Performed by: STUDENT IN AN ORGANIZED HEALTH CARE EDUCATION/TRAINING PROGRAM

## 2023-11-23 PROCEDURE — 85025 COMPLETE CBC W/AUTO DIFF WBC: CPT | Performed by: STUDENT IN AN ORGANIZED HEALTH CARE EDUCATION/TRAINING PROGRAM

## 2023-11-23 RX ORDER — PROPARACAINE HYDROCHLORIDE 5 MG/ML
1 SOLUTION/ DROPS OPHTHALMIC ONCE
Status: COMPLETED | OUTPATIENT
Start: 2023-11-23 | End: 2023-11-23

## 2023-11-23 RX ADMIN — PROPARACAINE HYDROCHLORIDE 1 DROP: 5 SOLUTION/ DROPS OPHTHALMIC at 11:40

## 2023-11-23 RX ADMIN — IOPAMIDOL 100 ML: 755 INJECTION, SOLUTION INTRAVENOUS at 12:12

## 2023-11-23 NOTE — ED PROVIDER NOTES
"Subjective   History of Present Illness  Pt is a 53 y.o. male with PMH as listed who presents for   Chief Complaint   Patient presents with    Eye Problem     Pt states approx 30 minutes ago he started to have color changes \"like a prism\" in his left eye. States it last approx 20 minutes and resolved before coming in to the ER. No headache accompanied the vision changes.        Patient presents with 20 minutes of vision change of left eye.  States that he had a colored prism appearance to the left eye peripheral vision which lasted for 20 minutes and then resolved prior to arrival.  Denies any associated headache, weakness, numbness, tingling.  Has no other new complaints at this time.  States that he does not follow with PCP regularly and is not on any medications at home.  Denies any other significant past medical history or any other new complaints at this time.    Review of Systems    Past Medical History:   Diagnosis Date    Allergic     CSF leak     teenager    Meningitis     teenager       No Known Allergies    Past Surgical History:   Procedure Laterality Date    CRANIOTOMY      x2, during teens       Family History   Problem Relation Age of Onset    Diabetes Mother     No Known Problems Father        Social History     Socioeconomic History    Marital status:    Tobacco Use    Smoking status: Every Day     Packs/day: 1.50     Years: 32.00     Additional pack years: 0.00     Total pack years: 48.00     Types: Cigarettes    Smokeless tobacco: Current     Types: Snuff   Substance and Sexual Activity    Alcohol use: Yes     Comment: occasional     Drug use: No    Sexual activity: Defer           Objective   Physical Exam  Constitutional:       Appearance: Normal appearance.   HENT:      Head: Normocephalic and atraumatic.      Mouth/Throat:      Mouth: Mucous membranes are moist.      Pharynx: Oropharynx is clear.   Eyes:      Conjunctiva/sclera: Conjunctivae normal.      Pupils: Pupils are equal, round, " and reactive to light.      Comments: IOP 17 left eye   Cardiovascular:      Rate and Rhythm: Normal rate and regular rhythm.   Pulmonary:      Effort: Pulmonary effort is normal.      Breath sounds: Normal breath sounds.   Abdominal:      General: Abdomen is flat.   Musculoskeletal:      Cervical back: Neck supple.   Skin:     General: Skin is warm and dry.   Neurological:      General: No focal deficit present.      Mental Status: He is alert and oriented to person, place, and time.      Sensory: No sensory deficit.      Motor: No weakness.   Psychiatric:         Mood and Affect: Mood normal.         Procedures           ED Course  ED Course as of 11/23/23 1233   Thu Nov 23, 2023   1125 Patient presents with visual field change in left eye for 20 minutes, now resolved.  Exam is unremarkable.  Will obtain CBC, CMP, troponin, EKG, CT head and CTA head.  We will also check for intraocular pressures.  Patient without any symptoms at this time. [JF]   1232 All lab work and imaging unremarkable for any acute findings.  Discussed with patient plan for discharge with PCP follow-up and as well as follow-up with ophthalmology tomorrow if able and early next week if no other times are available.  Patient stands and agrees with plan of care.  All questions answered.  Patient ambulatory and tolerating p.o. at time of discharge. [JF]      ED Course User Index  [JF] Iam Lehman MD                                           Medical Decision Making  My differential diagnosis for visual changes includes but is not limited to myopia, hyperopia, presbyopia, corneal, corneal ulceration, corneal perforation, lens dislocation, retinal detachment, retinal tear, vitreal traction, globe perforation, strabismus, migraine headache, TIA, CVA, diabetic ophthalmoplegia, cranial nerve palsies, multiple sclerosis, Eaton-Lambert syndrome, myasthenia gravis, ALS, concussion, intracranial hemorrhages.      Problems Addressed:  Visual changes:  complicated acute illness or injury    Amount and/or Complexity of Data Reviewed  Labs: ordered.     Details: Lab work all unremarkable for any acute findings.  Radiology: ordered.     Details: CT head negative for any acute intracranial hemorrhage based on my interpretation, CTA negative for any acute findings.  ECG/medicine tests: ordered.     Details: EKG   11/23/2023 at 1129  Rhythm sinus, rate 75  Normal axis, normal intervals, no ST changes  EKG interpreted by me contemporaneously by me with care      Risk  Prescription drug management.        Final diagnoses:   Visual changes       ED Disposition  ED Disposition       ED Disposition   Discharge    Condition   Stable    Comment   --               Heather Nguyen MD  [address]  [address]  [facility] [id number] [phone]    Schedule an appointment as soon as possible for a visit in 1 week  For re-evaluation    [facility]  [address]  [facility] [address]  [phone]  Schedule an appointment as soon as possible for a visit in 1 day  For re-evaluation         Medication List      No changes were made to your prescriptions during this visit.            Iam Lehman MD  11/23/23 0734

## 2023-11-26 LAB
QT INTERVAL: 402 MS
QTC INTERVAL: 449 MS

## 2024-01-09 ENCOUNTER — HOSPITAL ENCOUNTER (EMERGENCY)
Facility: HOSPITAL | Age: 54
Discharge: HOME | End: 2024-01-09
Attending: EMERGENCY MEDICINE | Admitting: EMERGENCY MEDICINE
Payer: MEDICAID

## 2024-01-09 ENCOUNTER — APPOINTMENT (OUTPATIENT)
Dept: GENERAL RADIOLOGY | Facility: HOSPITAL | Age: 54
End: 2024-01-09
Payer: MEDICAID

## 2024-01-09 VITALS
SYSTOLIC BLOOD PRESSURE: 123 MMHG | WEIGHT: 270 LBS | TEMPERATURE: 99.6 F | RESPIRATION RATE: 18 BRPM | HEART RATE: 93 BPM | HEIGHT: 71 IN | BODY MASS INDEX: 37.8 KG/M2 | OXYGEN SATURATION: 97 % | DIASTOLIC BLOOD PRESSURE: 87 MMHG

## 2024-01-09 DIAGNOSIS — J40 BRONCHITIS: Primary | ICD-10-CM

## 2024-01-09 LAB
FLUAV SUBTYP SPEC NAA+PROBE: NOT DETECTED
FLUBV RNA ISLT QL NAA+PROBE: NOT DETECTED
S PYO AG THROAT QL: NEGATIVE
SARS-COV-2 RNA RESP QL NAA+PROBE: NOT DETECTED

## 2024-01-09 PROCEDURE — 71045 X-RAY EXAM CHEST 1 VIEW: CPT

## 2024-01-09 PROCEDURE — 99283 EMERGENCY DEPT VISIT LOW MDM: CPT

## 2024-01-09 PROCEDURE — 87880 STREP A ASSAY W/OPTIC: CPT | Performed by: EMERGENCY MEDICINE

## 2024-01-09 PROCEDURE — 87636 SARSCOV2 & INF A&B AMP PRB: CPT | Performed by: EMERGENCY MEDICINE

## 2024-01-09 PROCEDURE — 87081 CULTURE SCREEN ONLY: CPT | Performed by: EMERGENCY MEDICINE

## 2024-01-09 RX ORDER — DOXYCYCLINE 100 MG/1
100 CAPSULE ORAL 2 TIMES DAILY
Qty: 20 CAPSULE | Refills: 0 | Status: SHIPPED | OUTPATIENT
Start: 2024-01-09

## 2024-01-09 RX ORDER — BENZONATATE 100 MG/1
200 CAPSULE ORAL 3 TIMES DAILY PRN
Qty: 30 CAPSULE | Refills: 0 | Status: SHIPPED | OUTPATIENT
Start: 2024-01-09

## 2024-01-09 NOTE — ED NOTES
Patient states that he has had a productive cough for the past week that has got better but then got a worse. Patient states he does get SOA while coughing. Patient denies chest discomfort but does say he had intercostal pain due to coughing. Patient states he has noticed increased swelling to BLE, patient states he is a smoker. Patient placed in ER gown, belongings at the bedside. Patient placed on monitor and call light within reach. Patient awaiting MD pinon.

## 2024-01-09 NOTE — DISCHARGE INSTRUCTIONS
Stop smoking.  Take the doxycycline as prescribed.  Use the Tessalon Perles as needed as prescribed.

## 2024-01-09 NOTE — ED PROVIDER NOTES
Subjective   History of Present Illness    Chief complaint: Cough and congestion    Location: Nasal    Quality/Severity: Whitish    Timing/Onset/Duration: Last Tuesday    Modifying Factors: Intermittently better    Associated Symptoms: No headache.  Patient's had subjective fever and chills.  Patient had a cough.  Patient had sore throat.  No earache.  Patient's had nasal congestion.  Patient has chest pain with cough.  No shortness of breath.  No abdominal pain.  No diarrhea or burning when he urinates.  No nausea or vomiting.    Narrative: This 53-year-old white male presents with cough and congestion.  He is a smoker.    PCP:Heather Nguyen MD      Review of Systems   Constitutional:  Positive for chills and fever (Subjective).   HENT:  Positive for congestion and sore throat. Negative for ear pain.    Respiratory:  Positive for cough. Negative for shortness of breath.    Cardiovascular:  Positive for chest pain.   Gastrointestinal:  Negative for abdominal pain, diarrhea, nausea and vomiting.   Genitourinary:  Negative for difficulty urinating.   Musculoskeletal:  Negative for back pain.   Skin:  Negative for rash.   Neurological:  Negative for headaches.         Past Medical History:   Diagnosis Date    Allergic     CSF leak     teenager    Hypertension     Meningitis     teenager       No Known Allergies    Past Surgical History:   Procedure Laterality Date    CRANIOTOMY      x2, during teens       Family History   Problem Relation Age of Onset    Diabetes Mother     No Known Problems Father        Social History     Socioeconomic History    Marital status:    Tobacco Use    Smoking status: Every Day     Packs/day: 1.50     Years: 32.00     Additional pack years: 0.00     Total pack years: 48.00     Types: Cigarettes    Smokeless tobacco: Current     Types: Snuff   Substance and Sexual Activity    Alcohol use: Yes     Comment: occasional     Drug use: No    Sexual activity: Defer            Objective   Physical Exam  Vitals (The temperature is 99.6 °F, pulse 95, respirations 20, /104, room air pulse ox 96%.) and nursing note reviewed.   Constitutional:       Appearance: Normal appearance.   HENT:      Head: Normocephalic and atraumatic.      Right Ear: Tympanic membrane normal.      Left Ear: Tympanic membrane normal.      Nose: Congestion present.      Mouth/Throat:      Mouth: Mucous membranes are moist.   Cardiovascular:      Rate and Rhythm: Normal rate and regular rhythm.      Pulses: Normal pulses.      Heart sounds: Normal heart sounds. No murmur heard.     No friction rub. No gallop.   Pulmonary:      Effort: Pulmonary effort is normal.      Breath sounds: Normal breath sounds.   Abdominal:      General: Abdomen is flat. Bowel sounds are normal. There is no distension.      Palpations: Abdomen is soft. There is no mass.      Tenderness: There is no abdominal tenderness. There is no guarding or rebound.      Hernia: No hernia is present.   Musculoskeletal:         General: No swelling or tenderness. Normal range of motion.      Cervical back: Normal range of motion and neck supple.   Skin:     General: Skin is warm and dry.      Findings: No rash.   Neurological:      General: No focal deficit present.      Mental Status: He is alert and oriented to person, place, and time.         Procedures           ED Course  ED Course as of 01/09/24 1038   Tue Jan 09, 2024   1034 COVID flu is negative.  The strep screen is negative. [RC]      ED Course User Index  [RC] Juan Loza MD      10:38 EST, 01/09/24:  Patient was reassessed.  He has no new complaints.  His vital signs reviewed and are stable.    10:38 EST, 01/09/24:  His diagnosis of bronchitis was discussed with him.  She will be given prescription for Tessalon Perles and doxycycline.  The patient has been sick for a week and not getting better.  The patient should follow-up with Dr. Nguyen in 1 week.  He should return to the  emergency department if there is fever, shortness of breath, worse in any way at all.  All patient questions were answered he will be discharged in good condition.                                       Medical Decision Making      Final diagnoses:   Bronchitis       ED Disposition  ED Disposition       None            No follow-up provider specified.       Medication List      No changes were made to your prescriptions during this visit.            Juan Loza MD  01/09/24 9652

## 2024-01-09 NOTE — Clinical Note
[facility]  [facility] EMERGENCY DEPARTMENT  [address] [place] [address]  Phone: [phone]    Allen Johnson was seen and treated in our emergency department on 1/9/2024.  He may return to work on 01/11/2024.  Perform duties as tolerated        Thank you for choosing [facility].    Allyn Barth RN

## 2024-01-11 ENCOUNTER — HOSPITAL ENCOUNTER (EMERGENCY)
Facility: HOSPITAL | Age: 54
Discharge: HOME | End: 2024-01-11
Attending: EMERGENCY MEDICINE
Payer: MEDICAID

## 2024-01-11 ENCOUNTER — APPOINTMENT (OUTPATIENT)
Dept: GENERAL RADIOLOGY | Facility: HOSPITAL | Age: 54
End: 2024-01-11
Payer: MEDICAID

## 2024-01-11 VITALS
TEMPERATURE: 98.1 F | OXYGEN SATURATION: 95 % | SYSTOLIC BLOOD PRESSURE: 144 MMHG | RESPIRATION RATE: 20 BRPM | DIASTOLIC BLOOD PRESSURE: 94 MMHG | HEIGHT: 71 IN | HEART RATE: 70 BPM | WEIGHT: 280 LBS | BODY MASS INDEX: 39.2 KG/M2

## 2024-01-11 DIAGNOSIS — R05.1 ACUTE COUGH: ICD-10-CM

## 2024-01-11 DIAGNOSIS — J44.1 COPD EXACERBATION: Primary | ICD-10-CM

## 2024-01-11 LAB — BACTERIA SPEC AEROBE CULT: NORMAL

## 2024-01-11 PROCEDURE — 99282 EMERGENCY DEPT VISIT SF MDM: CPT

## 2024-01-11 PROCEDURE — 71046 X-RAY EXAM CHEST 2 VIEWS: CPT

## 2024-01-11 RX ORDER — ALBUTEROL SULFATE 90 UG/1
2 AEROSOL, METERED RESPIRATORY (INHALATION) EVERY 4 HOURS PRN
Qty: 8 G | Refills: 0 | Status: SHIPPED | OUTPATIENT
Start: 2024-01-11

## 2024-01-11 RX ORDER — CODEINE PHOSPHATE/GUAIFENESIN 10-100MG/5
5 LIQUID (ML) ORAL 3 TIMES DAILY PRN
Qty: 237 ML | Refills: 0 | Status: SHIPPED | OUTPATIENT
Start: 2024-01-11

## 2024-01-11 RX ORDER — METHYLPREDNISOLONE 4 MG/1
TABLET ORAL
Qty: 21 TABLET | Refills: 0 | Status: SHIPPED | OUTPATIENT
Start: 2024-01-11

## 2024-01-11 NOTE — ED PROVIDER NOTES
Subjective   History of Present Illness  53-year-old male presents emergency room complaining of cough and upper respiratory infections for 1 week.  Patient states he was in the emergency room 2 days ago for same complaint and was given doxycycline and Tessalon Perles but these have not improved his symptoms.  Patient states he has coughing fits throughout the evening and night when he tries sleep and he has had difficulty getting around secondary to cough and feels short of breath.  Patient reports no fever.  Patient reports no other complaints.  Patient is a 1 and half to 2 pack smoker during his adult life.      Review of Systems   Constitutional:  Negative for activity change, appetite change, chills, diaphoresis, fatigue and fever.   HENT:  Positive for congestion. Negative for sinus pressure, sneezing and sore throat.    Eyes:  Negative for photophobia and visual disturbance.   Respiratory:  Positive for cough and shortness of breath.    Cardiovascular:  Negative for chest pain, palpitations and leg swelling.   Gastrointestinal:  Negative for abdominal distention, abdominal pain, diarrhea, nausea and vomiting.   Genitourinary:  Negative for dysuria and flank pain.   Musculoskeletal:  Negative for arthralgias, back pain and myalgias.   Skin:  Negative for rash.   Neurological:  Negative for dizziness, weakness and headaches.   Psychiatric/Behavioral:  Negative for behavioral problems and confusion.        Past Medical History:   Diagnosis Date    Allergic     CSF leak     teenager    Hypertension     Meningitis     teenager       No Known Allergies    Past Surgical History:   Procedure Laterality Date    CRANIOTOMY      x2, during teens       Family History   Problem Relation Age of Onset    Diabetes Mother     No Known Problems Father        Social History     Socioeconomic History    Marital status:    Tobacco Use    Smoking status: Every Day     Packs/day: 1.50     Years: 32.00     Additional pack  years: 0.00     Total pack years: 48.00     Types: Cigarettes    Smokeless tobacco: Current     Types: Snuff   Substance and Sexual Activity    Alcohol use: Yes     Comment: occasional     Drug use: No    Sexual activity: Defer           Objective   Physical Exam  Vitals and nursing note reviewed.   Constitutional:       General: He is not in acute distress.     Appearance: Normal appearance. He is not toxic-appearing or diaphoretic.      Comments: Pleasant 53-year-old male in no acute distress   HENT:      Head: Normocephalic and atraumatic.      Mouth/Throat:      Mouth: Mucous membranes are moist.   Eyes:      Conjunctiva/sclera: Conjunctivae normal.   Cardiovascular:      Rate and Rhythm: Normal rate and regular rhythm.      Pulses: Normal pulses.   Pulmonary:      Effort: Pulmonary effort is normal. No respiratory distress.      Breath sounds: No stridor. No wheezing, rhonchi or rales.      Comments: Decreased breath sounds bilaterally  Chest:      Chest wall: No tenderness.   Abdominal:      General: Abdomen is flat. There is no distension.      Tenderness: There is no abdominal tenderness.   Musculoskeletal:         General: No swelling or signs of injury. Normal range of motion.      Cervical back: Normal range of motion and neck supple.   Skin:     General: Skin is warm and dry.      Findings: No rash.   Neurological:      General: No focal deficit present.      Mental Status: He is alert.   Psychiatric:         Mood and Affect: Mood normal.         Behavior: Behavior normal.         Procedures           ED Course  ED Course as of 01/11/24 1411   Thu Jan 11, 2024   1354 Chest x-ray:  IMPRESSION:     1. Negative chest. No significant change.     This report was finalized on 1/11/2024 12:46 PM by Dr. Reynaldo Camarena MD.   []   1416 Discussed with patient need to cease smoking and follow-up with primary care physician for evaluation for possible sleep study as well.  Patient informed to put humidifier in his  bedroom to help him sleep at night.  Patient states he understands agrees with plan of discharge to home with follow-up with primary care as needed and/or can return the emergency room for new persistent or worsening symptoms. [BH]      ED Course User Index  [] Eldon Bernal MD                                             Medical Decision Making  My differential diagnosis for cough includes but is not limited to:  Upper respiratory infection, bronchitis, pneumonia, COPD exacerbation, cough variant asthma, cardiac asthma, coronary artery disease, congestive heart failure, bacterial, viral or lung infections, lung cancer, aspiration pneumonitis, aspiration of foreign body and Covid -19               Final diagnoses:   COPD exacerbation   Acute cough       ED Disposition  ED Disposition       ED Disposition   Discharge    Condition   Stable    Comment   --               Heather Nguyen MD  [address]  [address]  [place] [address] [phone]    Schedule an appointment as soon as possible for a visit       [facility] EMERGENCY DEPARTMENT  [address]   [place] [address] [phone]  Go to   As needed         Medication List        New Prescriptions      albuterol sulfate  (90 Base) MCG/ACT inhaler  Commonly known as: PROVENTIL HFA;VENTOLIN HFA;PROAIR HFA  Inhale 2 puffs Every 4 (Four) Hours As Needed for Shortness of Air or Wheezing.     guaiFENesin-codeine 100-10 MG/5ML liquid  Commonly known as: GUAIFENESIN AC  Take 5 mL by mouth 3 (Three) Times a Day As Needed for Cough.     methylPREDNISolone 4 MG dose pack  Commonly known as: MEDROL  Take as directed on package instructions.               Where to Get Your Medications        These medications were sent to [id number] - [place] [address] [place] [address] AT [facility] & [address] - [phone]  - [phone] [facility] [address] [facility] [address], [place] [address]      Phone:  [phone]   albuterol sulfate  (90 Base) MCG/ACT inhaler  guaiFENesin-codeine 100-10 MG/5ML liquid  methylPREDNISolone 4 MG dose pack            Eldon Bernal MD  01/11/24 2146